# Patient Record
Sex: FEMALE | Race: WHITE | Employment: UNEMPLOYED | URBAN - NONMETROPOLITAN AREA
[De-identification: names, ages, dates, MRNs, and addresses within clinical notes are randomized per-mention and may not be internally consistent; named-entity substitution may affect disease eponyms.]

---

## 2022-08-17 ENCOUNTER — TELEPHONE (OUTPATIENT)
Dept: NEUROLOGY | Age: 72
End: 2022-08-17

## 2022-09-01 ENCOUNTER — OFFICE VISIT (OUTPATIENT)
Dept: NEUROLOGY | Age: 72
End: 2022-09-01
Payer: MEDICARE

## 2022-09-01 VITALS
DIASTOLIC BLOOD PRESSURE: 70 MMHG | HEIGHT: 67 IN | SYSTOLIC BLOOD PRESSURE: 110 MMHG | BODY MASS INDEX: 23.54 KG/M2 | WEIGHT: 150 LBS

## 2022-09-01 DIAGNOSIS — M54.2 NECK PAIN: ICD-10-CM

## 2022-09-01 DIAGNOSIS — R41.3 MEMORY LOSS: ICD-10-CM

## 2022-09-01 DIAGNOSIS — F07.81 POST CONCUSSION SYNDROME: Primary | ICD-10-CM

## 2022-09-01 DIAGNOSIS — R29.6 FALLS FREQUENTLY: ICD-10-CM

## 2022-09-01 PROCEDURE — 1123F ACP DISCUSS/DSCN MKR DOCD: CPT | Performed by: PSYCHIATRY & NEUROLOGY

## 2022-09-01 PROCEDURE — 99204 OFFICE O/P NEW MOD 45 MIN: CPT | Performed by: PSYCHIATRY & NEUROLOGY

## 2022-09-01 RX ORDER — CETIRIZINE HYDROCHLORIDE 10 MG/1
10 TABLET ORAL DAILY
COMMUNITY

## 2022-09-01 RX ORDER — TRAZODONE HYDROCHLORIDE 50 MG/1
50 TABLET ORAL NIGHTLY
COMMUNITY

## 2022-09-01 RX ORDER — MECLIZINE HYDROCHLORIDE 25 MG/1
25 TABLET ORAL 3 TIMES DAILY PRN
COMMUNITY

## 2022-09-01 RX ORDER — TRIAMCINOLONE ACETONIDE 55 UG/1
2 SPRAY, METERED NASAL DAILY
COMMUNITY

## 2022-09-01 RX ORDER — MIDODRINE HYDROCHLORIDE 5 MG/1
10 TABLET ORAL 3 TIMES DAILY
COMMUNITY

## 2022-09-01 RX ORDER — ATENOLOL 25 MG/1
50 TABLET ORAL DAILY
COMMUNITY

## 2022-09-01 RX ORDER — TURMERIC ROOT EXTRACT 500 MG
TABLET ORAL
COMMUNITY

## 2022-09-01 RX ORDER — ONDANSETRON 4 MG/1
4 TABLET, FILM COATED ORAL EVERY 8 HOURS PRN
COMMUNITY

## 2022-09-01 RX ORDER — TRAMADOL HYDROCHLORIDE 50 MG/1
50 TABLET ORAL EVERY 6 HOURS PRN
COMMUNITY

## 2022-09-01 RX ORDER — DICYCLOMINE HCL 20 MG
20 TABLET ORAL EVERY 6 HOURS
COMMUNITY

## 2022-09-01 NOTE — PROGRESS NOTES
Chief Complaint   Patient presents with    New Patient     Referred by Gurpreet Beltrán for postconcussional syndrome. Pt states it was due to her falling. Kimmie Bertrand is a 67y.o. year old female who is seen for evaluation of her recurring head injuries from falls. She says that she has fallen 6 times over the past year. 2 of them were falling out of bed while reaching for something on the nightstand. Once she fell on ice and when she slipped getting out of the tub. Also has some orthostatic hypotension at times. Following her last fall 3 to 4 weeks ago she struck her head once more. She has been complaining of nonspecific dizziness which has a slight vertiginous sensation to it. She awakes in the mornings with headaches. She has occasional tingling in her hands. Reportedly had a negative CT of her head. Complains of poor memory for a year or more. Otherwise denies any focal signs or symptoms. .    Active Ambulatory Problems     Diagnosis Date Noted    Fibromyalgia 12/03/2012    Coronary artery disease 12/03/2012    Insomnia 12/03/2012    Hyperlipidemia     Sciatica     Family history of breast cancer in mother 12/03/2012    Fibrocystic breast disease 12/03/2012    Disorder of bone and cartilage 03/05/2013    GERD (gastroesophageal reflux disease)      Resolved Ambulatory Problems     Diagnosis Date Noted    No Resolved Ambulatory Problems     Past Medical History:   Diagnosis Date    Anxiety     Depression     Michael Lopez infection     Falling     Goiter     Head injury May-June 2009    Heart attack [facility]) March 2009    Joint pain     Poison ivy     Shingles     Unspecified inflammatory polyarthropathy        Past Surgical History:   Procedure Laterality Date    CHOLECYSTECTOMY      COLONOSCOPY      Dr Meryle Pilsner  March 2009, April 2009, March 2010    ERCP      x 2    HYSTERECTOMY (CERVIX STATUS UNKNOWN)  1992    partial    KNEE SURGERY  as a child    right for cartlidge removal       Family History   Problem Relation Age of Onset    Cancer Mother         breast, spread into other areas, lived for 5 yrs    Cancer Father         abdomen    Heart Attack Father        Allergies   Allergen Reactions    Cefdinir        Social History     Socioeconomic History    Marital status:      Spouse name: Not on file    Number of children: Not on file    Years of education: Not on file    Highest education level: Not on file   Occupational History    Not on file   Tobacco Use    Smoking status: Former     Packs/day: 0.50     Years: 44.00     Pack years: 22.00     Types: Cigarettes    Smokeless tobacco: Never   Substance and Sexual Activity    Alcohol use: Yes    Drug use: No    Sexual activity: Not on file   Other Topics Concern    Not on file   Social History Narrative    Not on file     Social Determinants of Health     Financial Resource Strain: Not on file   Food Insecurity: Not on file   Transportation Needs: Not on file   Physical Activity: Not on file   Stress: Not on file   Social Connections: Not on file   Intimate Partner Violence: Not on file   Housing Stability: Not on file       Review of Systems  Constitutional - No fever or chills. No diaphoresis or significant fatigue. HENT -  No tinnitus or significant hearing loss. Eyes - yes sudden vision change or eye pain  Respiratory - no significant shortness of breath or cough  Cardiovascular - no chest pain No palpitations or significant leg swelling  Gastrointestinal - no abdominal swelling or pain. Genitourinary - No difficulty urinating, dysuria  Musculoskeletal - yes back pain or myalgia. Skin - no color change or rash  Neurologic - No seizures. No lateralizing weakness. Hematologic - no easy bruising or excessive bleeding. Psychiatric - no severe anxiety or nervousness. All other review of systems are negative.          Current Outpatient Medications   Medication Sig Dispense Refill    Turmeric 500 MG TABS Take by mouth      traZODone (DESYREL) 50 MG tablet Take 50 mg by mouth nightly      meclizine (ANTIVERT) 25 MG tablet Take 25 mg by mouth 3 times daily as needed      atenolol (TENORMIN) 25 MG tablet Take 50 mg by mouth daily      cetirizine (ZYRTEC) 10 MG tablet Take 10 mg by mouth daily      traMADol (ULTRAM) 50 MG tablet Take 50 mg by mouth every 6 hours as needed for Pain. dicyclomine (BENTYL) 20 MG tablet Take 20 mg by mouth in the morning and 20 mg at noon and 20 mg in the evening and 20 mg before bedtime. HYOSCYAMINE SULFATE PO Take by mouth      ondansetron (ZOFRAN) 4 MG tablet Take 4 mg by mouth every 8 hours as needed for Nausea or Vomiting      midodrine (PROAMATINE) 5 MG tablet Take 10 mg by mouth 3 times daily      triamcinolone (NASACORT ALLERGY 24HR) 55 MCG/ACT nasal inhaler 2 sprays by Each Nostril route daily      ALPRAZolam (XANAX) 0.5 MG tablet Take 1 tablet by mouth 3 times daily as needed for Sleep or Anxiety. 50 tablet 1    atorvastatin (LIPITOR) 40 MG tablet Take 40 mg by mouth daily. DULoxetine (CYMBALTA) 60 MG capsule Take 1 capsule by mouth daily. 90 capsule 3    cyclobenzaprine (FLEXERIL) 10 MG tablet TAKE ONE TABLET BY MOUTH TWICE DAILY AS NEEDED 180 tablet 3    aspirin 81 MG chewable tablet Take 81 mg by mouth daily. nitroGLYCERIN (NITROLINGUAL) 0.4 MG/SPRAY spray Place 1 spray under the tongue every 5 minutes as needed. clopidogrel (PLAVIX) 75 MG tablet Take 75 mg by mouth daily. cyclobenzaprine (FLEXERIL) 10 MG tablet Take 10 mg by mouth 2 times daily as needed. Qty# 60      Refills 11        No current facility-administered medications for this visit.        Outpatient Medications Marked as Taking for the 9/1/22 encounter (Office Visit) with Maricel Sadler MD   Medication Sig Dispense Refill    Turmeric 500 MG TABS Take by mouth      traZODone (DESYREL) 50 MG tablet Take 50 mg by mouth nightly      meclizine (ANTIVERT) 25 MG tablet Take 25 mg by mouth 3 times daily as needed      atenolol (TENORMIN) 25 MG tablet Take 50 mg by mouth daily      cetirizine (ZYRTEC) 10 MG tablet Take 10 mg by mouth daily      traMADol (ULTRAM) 50 MG tablet Take 50 mg by mouth every 6 hours as needed for Pain. dicyclomine (BENTYL) 20 MG tablet Take 20 mg by mouth in the morning and 20 mg at noon and 20 mg in the evening and 20 mg before bedtime. HYOSCYAMINE SULFATE PO Take by mouth      ondansetron (ZOFRAN) 4 MG tablet Take 4 mg by mouth every 8 hours as needed for Nausea or Vomiting      midodrine (PROAMATINE) 5 MG tablet Take 10 mg by mouth 3 times daily      triamcinolone (NASACORT ALLERGY 24HR) 55 MCG/ACT nasal inhaler 2 sprays by Each Nostril route daily      ALPRAZolam (XANAX) 0.5 MG tablet Take 1 tablet by mouth 3 times daily as needed for Sleep or Anxiety. 50 tablet 1    atorvastatin (LIPITOR) 40 MG tablet Take 40 mg by mouth daily. DULoxetine (CYMBALTA) 60 MG capsule Take 1 capsule by mouth daily. 90 capsule 3    cyclobenzaprine (FLEXERIL) 10 MG tablet TAKE ONE TABLET BY MOUTH TWICE DAILY AS NEEDED 180 tablet 3    aspirin 81 MG chewable tablet Take 81 mg by mouth daily. nitroGLYCERIN (NITROLINGUAL) 0.4 MG/SPRAY spray Place 1 spray under the tongue every 5 minutes as needed. clopidogrel (PLAVIX) 75 MG tablet Take 75 mg by mouth daily. cyclobenzaprine (FLEXERIL) 10 MG tablet Take 10 mg by mouth 2 times daily as needed. Qty# 60      Refills 11          /70   Ht 5' 7\" (1.702 m)   Wt 150 lb (68 kg)   BMI 23.49 kg/m²       Constitutional - well developed, well nourished. Eyes - conjunctiva normal.  Pupils react to light  Ear, nose, throat -hearing intact to finger rub No scars, masses, or lesions over external nose or ears, no atrophy of tongue  Neck-symmetric, no masses noted, no jugular vein distension. No bruits noted.   Moderate decreased range of motion of the neck with significant tenderness in the suboccipital notches and in the paraspinal muscles  Respiration- chest wall appears symmetric, good expansion,   normal effort without use of accessory muscles  Cardiovascular- RRR  Musculoskeletal - no significant wasting of muscles noted, no bony deformities, gait no gross ataxia  Extremities-no clubbing, cyanosis or edema  Skin - warm, dry, and intact. No rash, erythema, or pallor. Psychiatric - mood, affect, and behavior appear normal.      Neurological exam  Awake, alert, fluent oriented x 3 appropriate affect  Attention and concentration appear appropriate  Recent and remote memory appears unremarkable. Short-term memory 4/4 at 5 minutes. Normal clock drawing. Follows complex commands. Speech normal without dysarthria  No clear issues with language of fund of knowledge    Cranial Nerve Exam   CN II- Visual fields grossly unremarkable. VA adequate. Discs sharp  CN III, IV,VI- PERRLA, EOMI, No nystagmus, conjugate eye movements, no ptosis  CN V-sensation intact to LT over face  CN VII-no facial asymmetry  CN VIII-Hearing intact   CN IX and X- Palate elevates in midline  CN XI-good shoulder shrug  CN XII-Tongue midline with no fasciculations or fibrillations    Motor Exam  V/V throughout upper and lower extremities bilaterally, no cogwheeling, normal tone    Sensory Exam decreased vibration and pinprick in the toes    Reflexes   2+ biceps bilaterally  2+ brachioradialis  2+ triceps  2+patella  2+ ankle jerks  No clonus ankles  No Barr's sign bilateral hands. No Babinski sign. Tremors- no tremors in hands or head noted    Gait  Normal base and speed  No ataxia.  No Romberg sign    Coordination  Finger to nose and LUIS EDUARDO-unremarkable    No results found for: EOVZJBAD23  No results found for: WBC, HGB, HCT, MCV, PLT  Lab Results   Component Value Date     09/12/2013    K 4.6 09/12/2013     09/12/2013    CO2 29 09/12/2013    BUN 13 09/12/2013    CREATININE 1.0 09/12/2013    GLUCOSE 98 09/12/2013    CALCIUM 9.0 09/12/2013 LABALBU 4.0 09/12/2013    BILITOT 0.3 09/12/2013    ALKPHOS 127 09/12/2013    AST 15 09/12/2013    ALT 36 09/12/2013    LABGLOM 59.52 09/12/2013           Assessment    ICD-10-CM    1. Post concussion syndrome  F07.81 External Referral To Physical Therapy      2. Memory loss  R41.3 MRI BRAIN WO CONTRAST      3. Falls frequently  R29.6 MRI BRAIN WO CONTRAST      4. Neck pain  M54.2 External Referral To Physical Therapy     MRI CERVICAL SPINE WO CONTRAST          Probable postconcussive syndrome and I think that a lot of her headaches and dizziness are coming from her neck. MRI of the head and neck have been ordered along with physical therapy on the neck. In the future we will consider balance training. Unclear if she needs a Zio patch and/or EEG for possible syncope. Unclear if she is actually passing out or not. Plan    Return in about 4 weeks (around 9/29/2022).     (Please note that portions of this note were completed with a voice recognition program. Efforts were made to edit the dictations but occasionally words are mis-transcribed.)

## 2022-09-13 ENCOUNTER — TELEPHONE (OUTPATIENT)
Dept: NEUROLOGY | Age: 72
End: 2022-09-13

## 2022-09-13 NOTE — TELEPHONE ENCOUNTER
Attempted to Called pt to r/s appt on 10/07/22 with . Unable to reach PT. Left vm stating that this appt has been canceled and to call our office back to r/s.

## 2022-09-14 ENCOUNTER — HOSPITAL ENCOUNTER (OUTPATIENT)
Dept: MRI IMAGING | Age: 72
Discharge: HOME | End: 2022-09-14
Payer: MEDICARE

## 2022-09-14 DIAGNOSIS — R41.3 MEMORY LOSS: ICD-10-CM

## 2022-09-14 DIAGNOSIS — R29.6 FALLS FREQUENTLY: ICD-10-CM

## 2022-09-14 DIAGNOSIS — M54.2 NECK PAIN: ICD-10-CM

## 2022-09-14 PROCEDURE — 72141 MRI NECK SPINE W/O DYE: CPT

## 2022-09-14 PROCEDURE — 72141 MRI NECK SPINE W/O DYE: CPT | Performed by: RADIOLOGY

## 2022-09-14 PROCEDURE — 70551 MRI BRAIN STEM W/O DYE: CPT

## 2022-10-07 NOTE — RESULT ENCOUNTER NOTE
I have reviewed this MRI. I disagree with the reading. I do not believe she has any of the mentioned diagnoses. I will see her in the office soon and we will revisit this. May repeat MRI in the near future.

## 2023-03-31 ENCOUNTER — OFFICE VISIT (OUTPATIENT)
Dept: NEUROLOGY | Age: 73
End: 2023-03-31
Payer: MEDICARE

## 2023-03-31 VITALS
OXYGEN SATURATION: 97 % | HEIGHT: 67 IN | HEART RATE: 65 BPM | DIASTOLIC BLOOD PRESSURE: 73 MMHG | BODY MASS INDEX: 23.54 KG/M2 | SYSTOLIC BLOOD PRESSURE: 112 MMHG | WEIGHT: 150 LBS

## 2023-03-31 DIAGNOSIS — R29.6 FALLS FREQUENTLY: ICD-10-CM

## 2023-03-31 DIAGNOSIS — Z86.79 HISTORY OF ORTHOSTATIC HYPOTENSION: ICD-10-CM

## 2023-03-31 DIAGNOSIS — F07.81 POST CONCUSSION SYNDROME: Primary | ICD-10-CM

## 2023-03-31 PROCEDURE — 99214 OFFICE O/P EST MOD 30 MIN: CPT | Performed by: PSYCHIATRY & NEUROLOGY

## 2023-03-31 PROCEDURE — 1123F ACP DISCUSS/DSCN MKR DOCD: CPT | Performed by: PSYCHIATRY & NEUROLOGY

## 2023-03-31 NOTE — PROGRESS NOTES
REVIEW OF SYSTEMS    Constitutional: []Fever []Sweats []Chills [] Recent Injury   [x] Denies all unless marked  HENT:[]Headache  [] Head Injury  [] Sore Throat  [] Ear Pain  [] Dizziness [] Hearing Loss   [x] Denies all unless marked  Spine:  [] Neck pain  [] Back pain  [] Sciatica  [x] Denies all unless marked  Cardiovascular:[]Chest Pain []Palpitations [] Heart Disease  [x] Denies all unless marked  Pulmonary: []Shortness of Breath []Cough   [x] Denies all unless marked  Gastrointestinal:  []Abdominal Pain  []Blood in Stool  []Diarrhea []Constipation []Nausea  []Vomiting  [x] Denies all unless marked  Genitourinary:  [] Dysuria [] Frequency  [] Incontinence [] Urgency   [x] Denies all unless marked  Musculoskeletal: [] Arthralgia  [] Myalgias [] Muscle cramps  [] Muscle twitches   [x] Denies all unless marked   Extremities:   [] Pain   [] Swelling   [x] Denies all unless marked  Skin:[] Rash  [] Color Change  [x] Denies all unless marked  Neurological:[] Visual Disturbance [] Double Vision [] Slurred Speech [] Trouble swallowing  [] Vertigo [] Tingling [] Numbness [] Weakness [] Loss of Balance   [] Loss of Consciousness [] Memory Loss [] Seizures  [x] Denies all unless marked  Psychiatric/Behavioral:[] Depression [] Anxiety  [x] Denies all unless marked  Sleep: []  Insomnia [] Sleep Disturbance [] Snoring [] Restless Legs [] Daytime Sleepiness [] Sleep Apnea  [x] Denies all unless marked
(gastroesophageal reflux disease)      Resolved Ambulatory Problems     Diagnosis Date Noted    No Resolved Ambulatory Problems     Past Medical History:   Diagnosis Date    Anxiety     CAD (coronary artery disease)     Depression     Michael Lopez infection     Falling     Goiter     Head injury May-June 2009    Headache     Heart attack ([facility] [address] 03/01/2009    Joint pain     Movement disorder     Neurocutaneous syndrome ([facility] [address]     Poison ivy     Shingles     Unspecified inflammatory polyarthropathy        Past Surgical History:   Procedure Laterality Date    CHOLECYSTECTOMY      COLONOSCOPY      Dr Kevin Saldivar  March 2009, April 2009, March 2010    ERCP      x 2    HYSTERECTOMY (CERVIX STATUS UNKNOWN)  1992    partial    KNEE SURGERY  as a child    right for cartlidge removal       Family History   Problem Relation Age of Onset    Cancer Mother         breast, spread into other areas, lived for 5 yrs    Cancer Father         abdomen    Heart Attack Father        Allergies   Allergen Reactions    Cefdinir        Social History     Socioeconomic History    Marital status: Single     Spouse name: Not on file    Number of children: Not on file    Years of education: Not on file    Highest education level: Not on file   Occupational History    Not on file   Tobacco Use    Smoking status: Former     Packs/day: 0.00     Years: 44.00     Pack years: 0.00     Types: Cigarettes, E-Cigarettes    Smokeless tobacco: Never   Vaping Use    Vaping Use: Every day   Substance and Sexual Activity    Alcohol use: Not Currently    Drug use: No    Sexual activity: Not Currently   Other Topics Concern    Not on file   Social History Narrative    Not on file     Social Determinants of Health     Financial Resource Strain: Not on file   Food Insecurity: Not on file   Transportation Needs: Not on file   Physical Activity: Not on file   Stress: Not on file   Social Connections: Not on file   Intimate

## 2023-04-03 ENCOUNTER — FOLLOWUP TELEPHONE ENCOUNTER (OUTPATIENT)
Dept: NEUROLOGY | Age: 73
End: 2023-04-03

## 2023-09-25 ENCOUNTER — TELEPHONE (OUTPATIENT)
Dept: NEUROSURGERY | Age: 73
End: 2023-09-25

## 2023-09-25 NOTE — TELEPHONE ENCOUNTER
1st attempt to contact patient.  I left a voicemail instructing patient to call back at [phone] to schedule their new patient appointment     Abnormal findings on diagnostic imaging of other specified body structures    [facility] [place]

## 2023-09-28 NOTE — TELEPHONE ENCOUNTER
[facility] New [facility]    Diagnosis/Reason for Referral?    Abnormal findings on diagnostic imaging of other specified body structures    2. Who is completing questionnaire? Patient  Caregiver Family      3. Has the patient had any previous spinal/brain surgeries? NO      A. If yes, what is the name of the facility in which the surgery was performed? B. Procedure/Surgery performed? C. Who was the surgeon? D. When was the surgery? MM/YY       E. Did the patient improve after the surgery? 4. Is this a second opinion? If yes, Dr. Luda Green would like to review patient first before making the appointment. 5. Have MRI Images been obtain within the last year? Yes  No      XR  CT     If yes, where [place] [facility] [place]   If yes, what part of the body? Lumbar  Cervical  Thoracic  Brain     If yes, when was it obtained? 9/19/23    Note: if the scan was performed at a facility other than [facility], the disc will need to be brought to the appointment or we need to reach out to obtain the disc. A. Was the patient instructed to provide the disc? Yes   No      8. Has the patient had a NCV/EMG within the last year? Yes  No     If yes, where was it performed and date? MM/YY  Location:      9. Has the patient been to Physical Therapy? Yes  No     If yes, what location, how long attended, and last visit? Location:        Therapy Lasted:    Date of Last Visit:      10. Has the patient been to Pain Management? Yes  No     If yes, what location and last visit     Location:   Last Visit:   Is it helping?

## 2023-10-18 ENCOUNTER — TELEPHONE (OUTPATIENT)
Dept: NEUROSURGERY | Age: 73
End: 2023-10-18

## 2023-10-18 ENCOUNTER — OFFICE VISIT (OUTPATIENT)
Dept: NEUROSURGERY | Age: 73
End: 2023-10-18

## 2023-10-18 VITALS
DIASTOLIC BLOOD PRESSURE: 76 MMHG | BODY MASS INDEX: 23.54 KG/M2 | OXYGEN SATURATION: 98 % | WEIGHT: 150 LBS | HEIGHT: 67 IN | RESPIRATION RATE: 18 BRPM | SYSTOLIC BLOOD PRESSURE: 113 MMHG | HEART RATE: 60 BPM

## 2023-10-18 DIAGNOSIS — M54.2 NECK PAIN: ICD-10-CM

## 2023-10-18 DIAGNOSIS — R20.0 BILATERAL HAND NUMBNESS: ICD-10-CM

## 2023-10-18 DIAGNOSIS — R20.8 DECREASED SENSATION: ICD-10-CM

## 2023-10-18 DIAGNOSIS — R26.81 UNSTABLE GAIT: ICD-10-CM

## 2023-10-18 DIAGNOSIS — M79.602 BILATERAL ARM PAIN: ICD-10-CM

## 2023-10-18 DIAGNOSIS — M79.601 BILATERAL ARM PAIN: ICD-10-CM

## 2023-10-18 DIAGNOSIS — M50.30 DDD (DEGENERATIVE DISC DISEASE), CERVICAL: Primary | ICD-10-CM

## 2023-10-18 DIAGNOSIS — R29.2 HYPERREFLEXIA: ICD-10-CM

## 2023-10-18 ASSESSMENT — ENCOUNTER SYMPTOMS
RESPIRATORY NEGATIVE: 1
GASTROINTESTINAL NEGATIVE: 1
EYES NEGATIVE: 1

## 2023-10-18 NOTE — PROGRESS NOTES
[facility] Neurosurgery  Office Visit      Chief Complaint   Patient presents with    New Patient     To establish care. Results     XR C-Spine at [facility] (9/19/2023)    Neck Pain     Patient presents for neck pain that is causes numbness, tingling, and weakness in her BUE. She does have a history of falls which she states she was diagnosed with \"post concussion syndrome. 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 HISTORY OF PRESENT ILLNESS:    Lisbeth Kaur is a 68 y.o. female who carries a diagnosis of fibromyalgia, CAD with stenting who presents with neck pain that is chronic, however, progressively started worsening after a fall in March. The pain does radiate into the BUE. She complains of her RIGHT clavicle protruding more than she's ever noticed since the fall. The patient complains of numbness and paresthesias of the bilateral hands, feet, and top of head. She does have numbness in the fingertips, trouble using hands to perform fine motor tasks. She is off balance. She has frequent headaches. The patient has underwent a non-operative treatment course that has included:  NSAIDs (cannot take)  Tylenol  Muscle Relaxers (flexeril)  Opiates (tramadol)      Of note she does not use tobacco and does take blood thinning medications (ASA and Plavix).                  Past Medical History:   Diagnosis Date    Anxiety     CAD (coronary artery disease)     Depression     Michael Lopez infection     Falling     Fibromyalgia     GERD (gastroesophageal reflux disease)     Goiter     multi-nodule    Head injury May-June 2009    fell and hurt back of head    Headache     Heart attack ([address]) 03/01/2009    Dr Linwood Nair    Hyperlipidemia     Joint pain     Movement disorder     Neurocutaneous syndrome ([address])     Poison ivy     recurrent    Sciatica     Shingles     Unspecified inflammatory polyarthropathy        Past Surgical History:   Procedure Laterality

## 2023-10-18 NOTE — TELEPHONE ENCOUNTER
Pt would like her MRI cervical spine to be done at [facility]. Told to bring CD since we have been having so much trouble with them pushing films.

## 2023-10-18 NOTE — PROGRESS NOTES
Review of Systems   Constitutional: Negative. HENT: Negative. Eyes: Negative. Respiratory: Negative. Cardiovascular: Negative. Gastrointestinal: Negative. Genitourinary: Negative. Musculoskeletal:  Positive for falls, myalgias and neck pain. Skin: Negative. Neurological:  Positive for tingling, loss of consciousness and weakness. Endo/Heme/Allergies: Negative. Psychiatric/Behavioral: Negative.

## 2023-10-24 NOTE — TELEPHONE ENCOUNTER
D to inquire about her appointment at [facility] for her MRI. I instructed the pt to contact [facility] about the appointment. Also reminded the pt to bring a CD of the MRI when she came for her follow up appointment.

## 2023-11-21 ENCOUNTER — OFFICE VISIT (OUTPATIENT)
Dept: NEUROSURGERY | Age: 73
End: 2023-11-21
Payer: MEDICARE

## 2023-11-21 VITALS
SYSTOLIC BLOOD PRESSURE: 112 MMHG | HEIGHT: 67 IN | DIASTOLIC BLOOD PRESSURE: 78 MMHG | BODY MASS INDEX: 23.54 KG/M2 | RESPIRATION RATE: 18 BRPM | WEIGHT: 150 LBS | HEART RATE: 66 BPM

## 2023-11-21 DIAGNOSIS — M50.30 DDD (DEGENERATIVE DISC DISEASE), CERVICAL: ICD-10-CM

## 2023-11-21 DIAGNOSIS — R29.2 HYPERREFLEXIA: ICD-10-CM

## 2023-11-21 DIAGNOSIS — R26.81 UNSTABLE GAIT: ICD-10-CM

## 2023-11-21 DIAGNOSIS — M48.02 CERVICAL STENOSIS OF SPINAL CANAL: ICD-10-CM

## 2023-11-21 DIAGNOSIS — M48.02 FORAMINAL STENOSIS OF CERVICAL REGION: Primary | ICD-10-CM

## 2023-11-21 DIAGNOSIS — M54.2 NECK PAIN: ICD-10-CM

## 2023-11-21 PROCEDURE — 1123F ACP DISCUSS/DSCN MKR DOCD: CPT | Performed by: NEUROLOGICAL SURGERY

## 2023-11-21 PROCEDURE — 99213 OFFICE O/P EST LOW 20 MIN: CPT | Performed by: NEUROLOGICAL SURGERY

## 2023-11-21 ASSESSMENT — ENCOUNTER SYMPTOMS
EYES NEGATIVE: 1
GASTROINTESTINAL NEGATIVE: 1
RESPIRATORY NEGATIVE: 1

## 2023-11-21 NOTE — PROGRESS NOTES
Review of Systems   Constitutional: Negative. HENT: Negative. Eyes: Negative. Respiratory: Negative. Cardiovascular: Negative. Gastrointestinal: Negative. Genitourinary: Negative. Musculoskeletal:  Positive for falls, joint pain, myalgias and neck pain. Skin: Negative. Neurological:  Positive for tingling and weakness. Endo/Heme/Allergies: Negative. Psychiatric/Behavioral: Negative.
canal measures 9 mm. C6-7 moderate to severe right foraminal stenosis      ASSESSMENT:    Elenita Kamara is a 68 y.o. female with complaints of neck pain, BUE pain and paresthesias, fine motor impairment and balance issues with severe DDD of the cervical spine. ICD-10-CM    1. Foraminal stenosis of cervical region  M48.02       2. Cervical stenosis of spinal canal  M48.02       3. DDD (degenerative disc disease), cervical  M50.30       4. Neck pain  M54.2       5. Hyperreflexia  R29.2       6. Unstable gait  R26.81             PLAN:  -We have discussed and reviewed the results of the MRI cervical spine with Ms. Johana Abdi at length. We explained that she does have significant DDD throughout her cervical spine that can definitely be contributing to her cervical spine pain. She does have foraminal stenosis that can contribute to her LUE pain, however, she has mostly neck pain and headaches which we do not typically recommend for solely neck pain and headaches due to the fact that the patient's may not have the improvement they hope for. She does not wish for surgery at this time anyway. We discussed PT, however, she states due to family issues and her current home like situation will not work for her. We discussed sending her to pain management for either medications or procedures. She wants to hold off on any referrals at this time.    -Follow up as needed for now, she knows to call if she should worsen      This dictation was generated by voice recognition computer software. Although all attempts are made to edit the dictation for accuracy, there may be errors in the transcription that are not intended.       Teri Homans,

## 2025-01-13 ENCOUNTER — TRANSCRIBE ORDERS (OUTPATIENT)
Dept: ADMINISTRATIVE | Facility: HOSPITAL | Age: 75
End: 2025-01-13

## 2025-01-14 ENCOUNTER — TRANSCRIBE ORDERS (OUTPATIENT)
Dept: ADMINISTRATIVE | Facility: HOSPITAL | Age: 75
End: 2025-01-14

## 2025-01-14 DIAGNOSIS — Z80.3 FAMILY HISTORY OF MALIGNANT NEOPLASM OF BREAST: Primary | ICD-10-CM

## 2025-01-30 ENCOUNTER — HOSPITAL ENCOUNTER (OUTPATIENT)
Dept: MRI IMAGING | Facility: HOSPITAL | Age: 75
Discharge: HOME | End: 2025-01-30
Admitting: SURGERY
Payer: MEDICARE

## 2025-01-30 DIAGNOSIS — Z80.3 FAMILY HISTORY OF MALIGNANT NEOPLASM OF BREAST: ICD-10-CM

## 2025-01-30 PROCEDURE — A9579 GAD-BASE MR CONTRAST NOS,1ML: HCPCS | Performed by: SURGERY

## 2025-01-30 PROCEDURE — C8937 CAD BREAST MRI: HCPCS

## 2025-01-30 PROCEDURE — C8908 MRI W/O FOL W/CONT, BREAST,: HCPCS

## 2025-01-30 PROCEDURE — 25510000001 GADOPICLENOL 0.5 MMOL/ML SOLUTION: Performed by: SURGERY

## 2025-01-30 RX ADMIN — GADOPICLENOL 7 ML: 485.1 INJECTION INTRAVENOUS at 13:49
